# Patient Record
Sex: FEMALE | Race: WHITE
[De-identification: names, ages, dates, MRNs, and addresses within clinical notes are randomized per-mention and may not be internally consistent; named-entity substitution may affect disease eponyms.]

---

## 2018-02-13 VITALS — DIASTOLIC BLOOD PRESSURE: 94 MMHG | SYSTOLIC BLOOD PRESSURE: 137 MMHG

## 2018-02-13 LAB — PLATELET COUNT, AUTOMATED: 356 K/UL (ref 150–450)

## 2018-02-14 ENCOUNTER — HOSPITAL ENCOUNTER (OUTPATIENT)
Dept: HOSPITAL 89 - ONC | Age: 63
LOS: 7 days | Discharge: HOME | End: 2018-02-21
Attending: NURSE PRACTITIONER
Payer: COMMERCIAL

## 2018-02-14 VITALS — HEIGHT: 64 IN | BODY MASS INDEX: 28.41 KG/M2 | WEIGHT: 166.38 LBS

## 2018-02-14 VITALS — DIASTOLIC BLOOD PRESSURE: 82 MMHG | SYSTOLIC BLOOD PRESSURE: 125 MMHG

## 2018-02-14 DIAGNOSIS — D72.820: Primary | ICD-10-CM

## 2018-02-14 DIAGNOSIS — R05: ICD-10-CM

## 2018-02-14 DIAGNOSIS — Z79.82: ICD-10-CM

## 2018-02-14 DIAGNOSIS — Z79.899: ICD-10-CM

## 2018-02-14 PROCEDURE — 36415 COLL VENOUS BLD VENIPUNCTURE: CPT

## 2018-02-14 PROCEDURE — 99212 OFFICE O/P EST SF 10 MIN: CPT

## 2018-02-14 PROCEDURE — 85025 COMPLETE CBC W/AUTO DIFF WBC: CPT

## 2018-02-14 NOTE — ONC PROGRESS NOTE - NP.HALSEY
Patient History


Date of Service


Feb 14, 2018





Reason For Visit/HPI


Patient is a very pleasant 62-year-old female seen in the clinic today for 

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 Patient had a CBC drawn today which 

was reviewed. With the patient's last office visit she was noted to have 

tenderness on her left neck and was found to have an abscessed tooth. Patient 

previously was experiencing drenching night sweats. Today she reports that 

these of completely resolved. She denies any hot flashes. She has no other B 

symptoms such as a rash or lymphadenopathy. Labs reviewed today are centrally 

within normal limits.





Patient has recently followed with primary care for upper respiratory 

infection. She reports that all family members experienced flulike symptoms 

followed by upper respiratory symptoms. She has been treated with antibiotics 

and feels that her symptoms have 90% resolved. She continues to have sinus 

drainage and occasional cough which is cause some discomfort in the chest. She 

denies any shortness of breath or difficulty breathing. She denies any sinus 

pain or pressure today. Patient has no concerns.





Problem List





(1) Atypical lymphocytosis





Oncology History


 Patient is a post menopausal female who was followed by Dr. Maldonado. She was 

found to have an abnormal CBC with a concern about the appearance of nucleated 

RBCs in her peripheral blood.  Her CBC on January 19, 2016 did reveal white 

count of 4.9, hemoglobin 15, hematocrit 44.5, platelets 278,000.  Her 

differential showed 26% atypical lymphocytes. In addition she presented with 

complaint of drenching night sweats, but no other B symptoms.  CBC on January 28 , 2016 showed white count 4.2, hemoglobin 14.5, hematocrit 43.5, platelets 276,

000.  Absolute neutrophil count was 2.2 and absolute lymphocytic count was 1.6.

  Flow cytometry of the peripheral blood came back negative for acute leukemia 

or any evidence of monoclonality or lymphoproliferative disorder, but it showed 

rare CD8 positive LGL T-cells (6% of the total leukocytes and 21% of the T-cells

).





The patient had bone marrow aspiration biopsy done on March 24, 2016 which 

showed mildly hypocellular bone marrow for age, 15%, with trilineage 

hematopoiesis with mild osteoporosis.  No evidence of lymphoma.  Flow cytometry 

showed polyclonal T and B cell population with no evidence of malignant 

lymphoma.  


Repeat flow cytometry on July 25, 2016 did reveal small population of large 

granular lymphocyte-like CD8 positive T-cells with variable CD5 expression, 

representing approximately 6% of the leukocytes without flow cytometric 

evidence of acute leukemia or B cell lymphoproliferative disorder.  It seemed 

that that clone is stable at 6%.





CBC from 9-8-16 showed a white count of 3.9 but a differential was not 

completed.  Patient did not want to repeat labs until scheduled unless her 

symptoms increase.  She has some anxiety regarding the ongoing lymph nodes in 

her neck. Today on the right side and tender.





Medical History


Family History:  


FH: abdominal aortic aneurysm repair


  MOTHER, Age:82


FH: breast cancer


  PGM


FH: depression


  MOTHER, Age:82


FH: hypothyroidism


  SISTER


FH: thyroid disease


  MOTHER, Age:82


Stent


  FATHER, Age:83


Thrombocytosis


  SISTER


Vascular disease


  MOTHER, Age:82





Psychosocial History


Social History


The patient is  with no children. She is not currently employed. She 

never smoked.  Denied any abuse of alcohol or drugs.


Smoking History:  No


Smoking Status:  Never Smoker





Medications and Allergies


Reported Medications


Aspirin (ASPIR 81) 81 Mg Tablet.dr, 81 MG PO QDAY, TAB


   8/10/17


Epinephrine (EPIPEN 2-YASMIN) 0.3 Mg/0.3 Ml Pen.injctr, 0.3 MG IM


   1/16/15


Cholecalciferol (Vitamin D3) (VITAMIN D3) 1,000 Unit Tablet, 1 TAB PO QDAY


   1/16/15


Allergies:  


Coded Allergies:  


     Shellfish (Verified  Allergy, Severe, HIVES, 4/20/16)


     aloe vera (Verified  Allergy, Mild, 8/1/09)


Uncoded Allergies:  


     ALLEVE (Allergy, Mild, 8/1/09)


     CT DYE (Allergy, Mild, 8/1/09)





Review of System/Physical Exam


Review of Systems


All Systems Reviewed/Normal:  Yes, Except as Noted


Respiratory:  Positive for Cough


HEENT:  Nasal Discharge (ported to be clear)





Physical Exam


Vital Signs





Temperature:         98.3 


Pulse:                    71 


BP Systolic:           125 


BP Diastolic:          82 


Respiratory Rate:   16 


O2 SAT:                96 


O2 Delivery:          Room Air  





Height (inches)      64.00  


Weight lb:             168 


Weight oz:            5.0 


Weight Kg (Elie):   76.35  





Pain:                    0


ECOG Score:  0


General:  Stable, Well Developed, Well Nourished, Not In Acute Distress


HEENT:  No Trauma


Neck:  Supple


Lungs:  Clear to Auscultation


Heart:  Regular Rate, Regular Rhythm, No Gallops, No Murmurs


Extremities:  No Cyanosis, No Clubbing, No Edema


Lymphadenopathy:  No Cervical


Psychiatric:  Mood appears normal, Affect appears normal


Skin:  No Skin Rashes, No Bruising





Diagnostic Studies


Diagnostic Studies


Laboratory


 Laboratory Tests


2/13/18 09:47








Laboratory Tests


2/13/18 09:47: 


White Blood Count 5.3, Red Blood Count 4.92, Hemoglobin 15.4, Hematocrit 45.0, 

Mean Corpuscular Volume 91.5, Mean Corpuscular Hemoglobin 31.3, Mean 

Corpuscular Hemoglobin Concent 34.2, Red Cell Distribution Width 14.6, Platelet 

Count 356, Mean Platelet Volume 7.4, Neutrophils (%) (Auto) 60.5, Lymphocytes (%

) (Auto) 28.7, Monocytes (%) (Auto) 7.9, Eosinophils (%) (Auto) 1.4, Basophils (

%) (Auto) 1.5, Nucleated RBC Relative Count (auto) 0.0, Neutrophils # (Auto) 3.2

, Lymphocytes # (Auto) 1.5, Monocytes # (Auto) 0.4, Eosinophils # (Auto) 0.1, 

Basophils # (Auto) 0.1, Nucleated RBC Absolute Count (auto) 0.00





Assessment and Plan


Assessment & Plan





1.  Atypical lymphocytosis associated with drenching night sweats initially.  

Patient did not have any evidence of lymphoproliferative disorder.  Bone marrow 

aspiration biopsy done March 24, 2016 came back negative for 

lymphoproliferative disorder.  There was polyclonal T and B cell population 

with no evidence of lymphoma by flow cytometry of the bone marrow.  Her current 

CBC is very good now.  Her white count total is 5.3. Previous labs have also 

been within normal limits. Patient denies any constitutional symptoms or B 

symptoms.  Previous night sweats have completely resolved. She denies any hot 

flashes.  She has had upper respiratory infection and has followed with primary 

care. She feels that this is resolving and she is almost back to baseline. 

Patient is compliant at following with primary care. 








At this time I believe that there is no reason to continue following with the 

patient. If she experiences abnormal labs drawn through primary care provider 

we are certainly happy to see her again.  I would recommend repeating the CBC 

in 6 months.
































I personally spent a total of 20 minutes. Of that 20 minutes was counseling/

coordination of patient's care.  


See my note above for details.


Copies to:   MARIA INES MALDONADO MD, NANCY J FNP-BC, ONC Feb 14, 2018 10:38

## 2018-03-19 ENCOUNTER — HOSPITAL ENCOUNTER (OUTPATIENT)
Dept: HOSPITAL 89 - SPU | Age: 63
End: 2018-03-19
Attending: INTERNAL MEDICINE
Payer: COMMERCIAL

## 2018-03-19 DIAGNOSIS — Z52.9: Primary | ICD-10-CM

## 2018-08-23 ENCOUNTER — HOSPITAL ENCOUNTER (OUTPATIENT)
Dept: HOSPITAL 89 - MAMO | Age: 63
End: 2018-08-23
Attending: INTERNAL MEDICINE
Payer: COMMERCIAL

## 2018-08-23 DIAGNOSIS — Z12.31: Primary | ICD-10-CM

## 2018-08-23 PROCEDURE — 77063 BREAST TOMOSYNTHESIS BI: CPT

## 2018-08-23 PROCEDURE — 77067 SCR MAMMO BI INCL CAD: CPT

## 2018-08-27 NOTE — RADIOLOGY IMAGING REPORT
[place] [facility] [place] 

 

PATIENT NAME: DAYSI ZAFAR

: [id number]

MR: [id number]

V: [id number]

EXAM DATE: [id number]

ORDERING PHYSICIAN: MARIA INES MALDONADO

TECHNOLOGIST: Blanche Vera

 

PROCEDURE:BILATERAL DIGITAL SCREENING MAMMOGRAM WITH CAD ASSISTED 

INTERPRETATION & 3D TOMOSYNTHESIS 

 

COMPARISON:Prior mammograms 17 with priors to 2012.

 

INDICATIONS:SCREENING

 

FINDINGS:  

Breasts have mostly fatty parenchymal density. 

There are no mammographic findings concerning for malignancy.

No significant change from priors.

 

DIAGNOSTIC CATEGORY 1--NEGATIVE.  

 

RECOMMENDATIONS:

ROUTINE MAMMOGRAM AND CLINICAL EVALUATION IN 1 YR.   

 

IMPRESSION:

BIRADS 1: Negative. 

 

 

 

 

 

 

 

 

 

Dictated by:  Devyn Hayward on 2018 at 10:11   

Transcribed by: FIX on 2018 at 10:40    

Approved by:  Thomas Dunphy M.D. on 2018 at 8:29   

[facility]

## 2018-12-12 ENCOUNTER — HOSPITAL ENCOUNTER (OUTPATIENT)
Dept: HOSPITAL 89 - LAB | Age: 63
End: 2018-12-12
Attending: INTERNAL MEDICINE
Payer: COMMERCIAL

## 2018-12-12 DIAGNOSIS — E66.3: ICD-10-CM

## 2018-12-12 DIAGNOSIS — E53.8: Primary | ICD-10-CM

## 2018-12-12 LAB
LDLC SERPL-MCNC: 177 MG/DL
PLATELET COUNT, AUTOMATED: 335 K/UL (ref 150–450)

## 2018-12-12 PROCEDURE — 84520 ASSAY OF UREA NITROGEN: CPT

## 2018-12-12 PROCEDURE — 84460 ALANINE AMINO (ALT) (SGPT): CPT

## 2018-12-12 PROCEDURE — 84478 ASSAY OF TRIGLYCERIDES: CPT

## 2018-12-12 PROCEDURE — 82435 ASSAY OF BLOOD CHLORIDE: CPT

## 2018-12-12 PROCEDURE — 84075 ASSAY ALKALINE PHOSPHATASE: CPT

## 2018-12-12 PROCEDURE — 82374 ASSAY BLOOD CARBON DIOXIDE: CPT

## 2018-12-12 PROCEDURE — 82607 VITAMIN B-12: CPT

## 2018-12-12 PROCEDURE — 36415 COLL VENOUS BLD VENIPUNCTURE: CPT

## 2018-12-12 PROCEDURE — 82565 ASSAY OF CREATININE: CPT

## 2018-12-12 PROCEDURE — 82310 ASSAY OF CALCIUM: CPT

## 2018-12-12 PROCEDURE — 84295 ASSAY OF SERUM SODIUM: CPT

## 2018-12-12 PROCEDURE — 82040 ASSAY OF SERUM ALBUMIN: CPT

## 2018-12-12 PROCEDURE — 83718 ASSAY OF LIPOPROTEIN: CPT

## 2018-12-12 PROCEDURE — 84132 ASSAY OF SERUM POTASSIUM: CPT

## 2018-12-12 PROCEDURE — 84443 ASSAY THYROID STIM HORMONE: CPT

## 2018-12-12 PROCEDURE — 82465 ASSAY BLD/SERUM CHOLESTEROL: CPT

## 2018-12-12 PROCEDURE — 85025 COMPLETE CBC W/AUTO DIFF WBC: CPT

## 2018-12-12 PROCEDURE — 82247 BILIRUBIN TOTAL: CPT

## 2018-12-12 PROCEDURE — 84155 ASSAY OF PROTEIN SERUM: CPT

## 2018-12-12 PROCEDURE — 84450 TRANSFERASE (AST) (SGOT): CPT

## 2018-12-12 PROCEDURE — 82947 ASSAY GLUCOSE BLOOD QUANT: CPT
